# Patient Record
Sex: FEMALE | URBAN - METROPOLITAN AREA
[De-identification: names, ages, dates, MRNs, and addresses within clinical notes are randomized per-mention and may not be internally consistent; named-entity substitution may affect disease eponyms.]

---

## 2024-06-12 ENCOUNTER — TELEPHONE (OUTPATIENT)
Dept: SURGERY | Age: 79
End: 2024-06-12

## 2024-06-12 NOTE — TELEPHONE ENCOUNTER
Spoke to [facility] to request most recent CT scan, colonoscopy and pathology reports be faxed over. Currently waiting to receive reports.     Spoke to [facility] to request most recent CT scan images be pushed to [employer] PACS.

## 2024-06-13 NOTE — TELEPHONE ENCOUNTER
Colonoscopy and CT reports from [facility] received and scanned into [facility].

## 2024-06-18 ENCOUNTER — OFFICE VISIT (OUTPATIENT)
Dept: SURGERY | Age: 79
End: 2024-06-18
Payer: COMMERCIAL

## 2024-06-18 VITALS
BODY MASS INDEX: 24.75 KG/M2 | HEIGHT: 64 IN | SYSTOLIC BLOOD PRESSURE: 129 MMHG | WEIGHT: 145 LBS | TEMPERATURE: 96.8 F | DIASTOLIC BLOOD PRESSURE: 68 MMHG | HEART RATE: 61 BPM

## 2024-06-18 DIAGNOSIS — C20 RECTAL CANCER (HCC): Primary | ICD-10-CM

## 2024-06-18 PROCEDURE — 99205 OFFICE O/P NEW HI 60 MIN: CPT | Performed by: SURGERY

## 2024-06-18 PROCEDURE — 1123F ACP DISCUSS/DSCN MKR DOCD: CPT | Performed by: SURGERY

## 2024-06-18 RX ORDER — LEVOTHYROXINE SODIUM 0.1 MG/1
100 TABLET ORAL
COMMUNITY

## 2024-06-18 RX ORDER — GABAPENTIN 400 MG/1
CAPSULE ORAL
COMMUNITY
Start: 2024-05-28 | End: 2024-06-27

## 2024-06-18 RX ORDER — AMANTADINE HYDROCHLORIDE 100 MG/1
CAPSULE, GELATIN COATED ORAL
COMMUNITY
Start: 2023-05-05

## 2024-06-18 RX ORDER — AMOXICILLIN 250 MG
1 CAPSULE ORAL DAILY PRN
COMMUNITY
Start: 2024-06-05

## 2024-06-18 RX ORDER — ATORVASTATIN CALCIUM 40 MG/1
40 TABLET, FILM COATED ORAL DAILY
COMMUNITY
Start: 2024-04-09

## 2024-06-18 RX ORDER — LEVODOPA AND CARBIDOPA 145; 36.25 MG/1; MG/1
1 CAPSULE, EXTENDED RELEASE ORAL 3 TIMES DAILY
COMMUNITY
Start: 2023-08-31

## 2024-06-18 RX ORDER — ACETAMINOPHEN 325 MG/1
650 TABLET ORAL EVERY 6 HOURS PRN
COMMUNITY
Start: 2024-06-05

## 2024-06-18 NOTE — PROGRESS NOTES
[facility] [place] [facility] COLORECTAL SURGERY  [address] [place] [address]  [address]  [place] [id number]  Dept: [phone]  Dept Fax: [phone]  Loc: [phone]    Visit Date: 6/18/2024    Audelia Feliciano is a 78 y.o. female who presents today for: New Patient (Rectal Cancer )      HPI:       Audelia Feliciano is a 78 y.o. female referred to me by Dr. Smith of GI for further evaluation regarding new diagnosis of rectal cancer.    Audelia recently underwent colonoscopy 6/3/24 with Dr. Smith.  It was incomplete due to poor prep and the colonoscope was only able to reach up to the sigmoid colon.  A large 3 cm sessile polyp in the distal rectum was removed by piecemeal hot snare and 3 clips were applied post polypectomy.  She had a repeat colonoscopy on 6/4/2024 due to post colonoscopy bleeding.  During the scope, the colon prep was still very poor and the colonoscope could only be advanced to the hepatic flexure.  She was noted to have tortuous, redundant colon with multiple diverticula in the sigmoid.  She was found to have a 2 cm polyp 30 cm from the anal verge that was not removed, as well as a mid rectal large residual villous appearing polyp that measured 4 cm in size, located 7 cm from the anal verge.  There was notes of the distal portion of this extended to 2 to 3 cm from the dentate line.    She had a CTA of the abdomen pelvis due to the rectal bleeding and was not noted to have any evidence of metastatic disease.    She receives ureteral stent replacement every 3 months at .  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  There was also note of a serosal colonic injury that required general surgery involvement.    She also states she has a history of open cholecystectomy and hysterectomy    She has not had a CT chest or CEA at this point.    Patient's problem list,